# Patient Record
Sex: MALE | Race: WHITE | NOT HISPANIC OR LATINO | Employment: UNEMPLOYED | URBAN - NONMETROPOLITAN AREA
[De-identification: names, ages, dates, MRNs, and addresses within clinical notes are randomized per-mention and may not be internally consistent; named-entity substitution may affect disease eponyms.]

---

## 2017-01-03 ENCOUNTER — OFFICE VISIT (OUTPATIENT)
Dept: FAMILY MEDICINE CLINIC | Facility: CLINIC | Age: 37
End: 2017-01-03

## 2017-01-03 VITALS
HEART RATE: 120 BPM | WEIGHT: 195 LBS | DIASTOLIC BLOOD PRESSURE: 78 MMHG | OXYGEN SATURATION: 97 % | SYSTOLIC BLOOD PRESSURE: 128 MMHG | BODY MASS INDEX: 27.92 KG/M2 | HEIGHT: 70 IN

## 2017-01-03 DIAGNOSIS — G25.81 RESTLESS LEG SYNDROME: Primary | ICD-10-CM

## 2017-01-03 PROCEDURE — 99213 OFFICE O/P EST LOW 20 MIN: CPT | Performed by: FAMILY MEDICINE

## 2017-01-03 NOTE — PROGRESS NOTES
Subjective:     Venkata Granado is a 37 y.o. male who presents for follow up for restless leg syndrome. Patient states that he has tingling in bilateral lower extremities and is out of his carbidopa/levodopa that was prescribed to him by Dr Adams in the past for this matter. He states that he is not taking any other medications and feels well otherwise. He has been on risperidone and paxil  in the past but denies ever having any hallucinations and states that when he is on these medications his mood is worse. He has no other acute complaints or concerns today.    Preventative:  Over the past 2 weeks, have you felt down, depressed, or hopeless?No   Over the past 2 weeks, have you felt little interest or pleasure in doing things?No  Clinical depression screening refused by patient.No     On osteoporosis therapy?No     Past Medical Hx:  Past Medical History   Diagnosis Date   • Carbuncle of forearm    • Depressive disorder    • Health maintenance examination      Health examination of sub-group      • Hypertensive disorder    • Pain in throat      Pain in throat - CONTUSION      • Peripheral nervous system disorder    • Restless legs    • Skin sensation disturbance        Past Surgical Hx:  No past surgical history on file.    Health Maintenance:  Health Maintenance   Topic Date Due   • TDAP/TD VACCINES (2 - Td) 01/01/2022   • PNEUMOCOCCAL VACCINE (19-64 MEDIUM RISK)  Addressed   • INFLUENZA VACCINE  Addressed       Current Meds:    Current Outpatient Prescriptions:   •  carbidopa-levodopa (SINEMET)  MG per tablet, Take 1 tablet by mouth 3 (Three) Times a Day., Disp: 90 tablet, Rfl: 6  •  meloxicam (MOBIC) 15 MG tablet, Take 15 mg by mouth daily., Disp: , Rfl:   •  PARoxetine (PAXIL) 20 MG tablet, Take 20 mg by mouth every night., Disp: , Rfl:   •  risperiDONE (RisperDAL) 1 MG tablet, Take 1 tablet by mouth 2 (Two) Times a Day., Disp: 60 tablet, Rfl: 2    Allergies:  Review of patient's allergies indicates  "no known allergies.    Family Hx:  Family History   Problem Relation Age of Onset   • Coronary artery disease Other    • Depression Other         Social History:  Social History     Social History   • Marital status:      Spouse name: N/A   • Number of children: N/A   • Years of education: N/A     Occupational History   • Not on file.     Social History Main Topics   • Smoking status: Current Every Day Smoker   • Smokeless tobacco: Never Used   • Alcohol use No   • Drug use: No   • Sexual activity: Defer     Other Topics Concern   • Not on file     Social History Narrative       Review of Systems  General:negative for - chills, fatigue, fever, hot flashes, malaise, night sweats, weight gain or weight loss  Psychological: negative for - anxiety, depression, sleep disturbances or suicidal ideation  Ophthalmic: negative for - blurry vision or loss of vision  ENT: negative for - hearing change, nasal congestion or sore throat  Hematological and Lymphatic: negative for - jaundice  Endocrine: negative for - hair pattern changes, skin changes or temperature intolerance  Respiratory: no cough, shortness of breath, or wheezing  Cardiovascular: no chest pain, edema or dyspnea on exertion  Gastrointestinal: no  Nausea/vomiting, abdominal pain, change in bowel habits, or black or bloody stools  Genito-Urinary: no dysuria, trouble voiding, or hematuria  Musculoskeletal: negative for - joint pain or muscle pain  Neurological: negative for - dizziness, headaches, numbness or seizures. Positive for tingling in bilateral lower extremities.  Dermatological: negative for rash and skin lesion changes      Objective:     Visit Vitals   • /78   • Pulse 120   • Ht 70\" (177.8 cm)   • Wt 195 lb (88.5 kg)   • SpO2 97%   • BMI 27.98 kg/m2           General Appearance:    Alert, cooperative, no distress, appears stated age   Head:    Normocephalic, without obvious abnormality, atraumatic   Eyes:    PERRL, conjunctiva/corneas " clear, EOM's intact   Ears:    Normal external ear canals, both ears   Nose:   Nares normal, septum midline, mucosa normal, no drainage     or sinus tenderness   Throat:   Lips, mucosa, and tongue normal; teeth and gums normal   Neck:   Supple, symmetrical, trachea midline, no adenopathy;     thyroid:  no enlargement/tenderness/nodules; no carotid    bruit   Back:     Symmetric, no curvature, ROM normal, no CVA tenderness   Lungs:     Clear to auscultation bilaterally, respirations unlabored   Chest Wall:    No tenderness or deformity    Heart:    Regular rate and rhythm, S1 and S2 normal, no murmur, rub    or gallop   Abdomen:     Soft, non-tender, bowel sounds active all four quadrants,     no masses, no organomegaly   Extremities:   Extremities normal, atraumatic, no cyanosis or edema   Pulses:   2+ and symmetric all extremities   Skin:   Skin color, texture, turgor normal, no rashes or lesions   Lymph nodes:   Cervical, supraclavicular, and axillary nodes normal   Neurologic:   CNII-XII grossly intact              Assessment/Plan:     1. Restless leg syndrome         Follow-up:     Return in about 6 months (around 7/3/2017) for Next scheduled follow up.  1. Restless leg syndrome- refill patient's carbidopa/levodopa prescription.    GOALS:  Improve restless leg syndrome symptoms    Preventative:  Vaccines:   Tetanus vaccine: up to date  Annual influenza vaccine: not up to date - patient refused today   Pneumococcal vaccine: not up to date - patient refused today    Smoking cessation counseling was provided.  does not drink  eat more fruits and vegetables, increase water intake, increase physical activity and have 3 meals a day    RISK SCORE: 3        This document has been electronically signed by Pablo Sibley MD on January 4, 2017 10:40 AM

## 2017-10-10 ENCOUNTER — OFFICE VISIT (OUTPATIENT)
Dept: FAMILY MEDICINE CLINIC | Facility: CLINIC | Age: 37
End: 2017-10-10

## 2017-10-10 VITALS
SYSTOLIC BLOOD PRESSURE: 140 MMHG | DIASTOLIC BLOOD PRESSURE: 90 MMHG | HEART RATE: 90 BPM | OXYGEN SATURATION: 96 % | WEIGHT: 219 LBS | BODY MASS INDEX: 31.35 KG/M2 | HEIGHT: 70 IN

## 2017-10-10 DIAGNOSIS — G25.81 RESTLESS LEG SYNDROME: ICD-10-CM

## 2017-10-10 DIAGNOSIS — R09.81 NASAL CONGESTION: ICD-10-CM

## 2017-10-10 DIAGNOSIS — Z13.220 LIPID SCREENING: ICD-10-CM

## 2017-10-10 DIAGNOSIS — Z00.00 ADULT GENERAL MEDICAL EXAM: Primary | ICD-10-CM

## 2017-10-10 LAB
EXPIRATION DATE: NORMAL
FLUAV AG NPH QL: NORMAL
FLUBV AG NPH QL: NORMAL
INTERNAL CONTROL: NORMAL
Lab: NORMAL

## 2017-10-10 PROCEDURE — 99213 OFFICE O/P EST LOW 20 MIN: CPT | Performed by: FAMILY MEDICINE

## 2017-10-10 PROCEDURE — 87804 INFLUENZA ASSAY W/OPTIC: CPT | Performed by: FAMILY MEDICINE

## 2017-10-10 NOTE — PROGRESS NOTES
I have reviewed the notes, assessments, and/or procedures performed. I concur with her/his documentation of Venkata Granado.     Kennedy Harden, DO

## 2017-10-10 NOTE — PROGRESS NOTES
"  Subjective:     Venkata Granado is a 37 y.o. male who presents for follow up for restless leg syndrome. Patient states that he has tingling in bilateral lower extremities and is out of his carbidopa/levodopa for the past four months because he has been in assisted. He states that he has been congested since yesterday and has been coughing. Patient felt like he may have had fever last night. He reports that his mother has had pneumonia in the past month and he has been around her but no other sick contacts. No sore throat reported. Patient continues to refuse the risperdal and paxil that he was on in the past as it makes him \"crazy.\" Patient has no other complaints or concerns today.    Preventative:  Over the past 2 weeks, have you felt down, depressed, or hopeless?No   Over the past 2 weeks, have you felt little interest or pleasure in doing things?No  Clinical depression screening refused by patient.No     On osteoporosis therapy?No     Past Medical Hx:  Past Medical History:   Diagnosis Date   • Carbuncle of forearm    • Depressive disorder    • Health maintenance examination     Health examination of sub-group      • Hypertensive disorder    • Pain in throat     Pain in throat - CONTUSION      • Peripheral nervous system disorder    • Restless legs    • Skin sensation disturbance        Past Surgical Hx:  No past surgical history on file.    Health Maintenance:  Health Maintenance   Topic Date Due   • TDAP/TD VACCINES (2 - Td) 01/01/2022   • PNEUMOCOCCAL VACCINE (19-64 MEDIUM RISK)  Addressed   • INFLUENZA VACCINE  Addressed       Current Meds:    Current Outpatient Prescriptions:   •  carbidopa-levodopa (SINEMET)  MG per tablet, Take 1 tablet by mouth 3 (Three) Times a Day., Disp: 90 tablet, Rfl: 6  •  meloxicam (MOBIC) 15 MG tablet, Take 15 mg by mouth daily., Disp: , Rfl:   •  PARoxetine (PAXIL) 20 MG tablet, Take 20 mg by mouth every night., Disp: , Rfl:   •  risperiDONE (RisperDAL) 1 MG tablet, Take 1 " "tablet by mouth 2 (Two) Times a Day., Disp: 60 tablet, Rfl: 2    Allergies:  Review of patient's allergies indicates no known allergies.    Family Hx:  Family History   Problem Relation Age of Onset   • Coronary artery disease Other    • Depression Other         Social History:  Social History     Social History   • Marital status:      Spouse name: N/A   • Number of children: N/A   • Years of education: N/A     Occupational History   • Not on file.     Social History Main Topics   • Smoking status: Current Every Day Smoker   • Smokeless tobacco: Never Used   • Alcohol use No   • Drug use: No   • Sexual activity: Defer     Other Topics Concern   • Not on file     Social History Narrative       Review of Systems  General:negative for - chills, fatigue, hot flashes, malaise, night sweats, weight gain or weight loss. Positive for subjective fever.  Psychological: negative for - anxiety, depression, sleep disturbances or suicidal ideation  Ophthalmic: negative for - blurry vision or loss of vision  ENT: negative for - hearing change or sore throat. Positive for nasal congestion.  Hematological and Lymphatic: negative for - jaundice  Endocrine: negative for - hair pattern changes, skin changes or temperature intolerance  Respiratory: no cough, shortness of breath, or wheezing  Cardiovascular: no chest pain, edema or dyspnea on exertion  Gastrointestinal: no  Nausea/vomiting, abdominal pain, change in bowel habits, or black or bloody stools  Genito-Urinary: no dysuria, trouble voiding, or hematuria  Musculoskeletal: negative for - joint pain or muscle pain  Neurological: negative for - dizziness, headaches, numbness or seizures. Positive for tingling in bilateral lower extremities worse at night time.  Dermatological: negative for rash and skin lesion changes      Objective:     /90  Pulse 90  Ht 70\" (177.8 cm)  Wt 219 lb (99.3 kg)  SpO2 96%  BMI 31.42 kg/m2        General Appearance:    Alert, " cooperative, no distress, appears stated age   Head:    Normocephalic, without obvious abnormality, atraumatic   Eyes:    PERRL, conjunctiva/corneas clear, EOM's intact   Ears:    Normal external ear canals, both ears   Nose:   Nares normal, septum midline, mucosa normal, no drainage     or sinus tenderness   Throat:   Lips, mucosa, and tongue normal; teeth and gums normal   Neck:   Supple, symmetrical, trachea midline, no adenopathy   Back:     Symmetric, no curvature, ROM normal, no CVA tenderness   Lungs:     Clear to auscultation bilaterally, respirations unlabored   Chest Wall:    No tenderness or deformity    Heart:    Regular rate and rhythm, S1 and S2 normal, no murmur, rub    or gallop   Abdomen:     Soft, non-tender, bowel sounds active all four quadrants,     no masses, no organomegaly   Extremities:   Extremities normal, atraumatic, no cyanosis or edema   Pulses:   2+ and symmetric all extremities   Skin:   Skin color, texture, turgor normal, no rashes or lesions   Lymph nodes:   Cervical, supraclavicular nodes normal   Neurologic:   CNII-XII grossly intact              Assessment/Plan:      Diagnosis Plan   1. Adult general medical exam  CBC & Differential    Comprehensive Metabolic Panel   2. Lipid screening  Lipid Panel   3. Nasal congestion  POC Influenza A / B negative in the office, explained this is likely a viral syndrome, recommended plenty of fluid and rest. Patient understands and agrees.   4. Restless leg syndrome  carbidopa-levodopa (SINEMET)  MG per tablet refilled       Follow-up:     Return in about 6 months (around 4/10/2018) for Next scheduled follow up.  1. Restless leg syndrome- refill patient's carbidopa/levodopa prescription.    GOALS:  Improve restless leg syndrome symptoms    Preventative:  Vaccines:   Tetanus vaccine: up to date  Annual influenza vaccine: not up to date - patient refused today   Pneumococcal vaccine: not up to date - patient refused today    Smoking  cessation counseling was provided.  does not drink  eat more fruits and vegetables, increase water intake, increase physical activity and have 3 meals a day    RISK SCORE: 3        This document has been electronically signed by Pablo Sibley MD on October 10, 2017 3:46 PM

## 2018-05-07 ENCOUNTER — LAB (OUTPATIENT)
Dept: LAB | Facility: HOSPITAL | Age: 38
End: 2018-05-07

## 2018-05-07 ENCOUNTER — OFFICE VISIT (OUTPATIENT)
Dept: FAMILY MEDICINE CLINIC | Facility: CLINIC | Age: 38
End: 2018-05-07

## 2018-05-07 VITALS
BODY MASS INDEX: 31.65 KG/M2 | HEART RATE: 105 BPM | HEIGHT: 70 IN | DIASTOLIC BLOOD PRESSURE: 80 MMHG | OXYGEN SATURATION: 98 % | WEIGHT: 221.06 LBS | SYSTOLIC BLOOD PRESSURE: 142 MMHG

## 2018-05-07 DIAGNOSIS — Z00.00 ADULT GENERAL MEDICAL EXAM: Primary | ICD-10-CM

## 2018-05-07 DIAGNOSIS — G25.81 RESTLESS LEG SYNDROME: ICD-10-CM

## 2018-05-07 DIAGNOSIS — Z11.59 NEED FOR HEPATITIS C SCREENING TEST: ICD-10-CM

## 2018-05-07 DIAGNOSIS — Z11.59 NEED FOR HEPATITIS B SCREENING TEST: ICD-10-CM

## 2018-05-07 DIAGNOSIS — F19.91 HISTORY OF DRUG USE: ICD-10-CM

## 2018-05-07 DIAGNOSIS — Z11.1 TUBERCULOSIS SCREENING: ICD-10-CM

## 2018-05-07 DIAGNOSIS — Z00.00 ADULT GENERAL MEDICAL EXAM: ICD-10-CM

## 2018-05-07 DIAGNOSIS — Z13.220 LIPID SCREENING: ICD-10-CM

## 2018-05-07 LAB
ALBUMIN SERPL-MCNC: 4.1 G/DL (ref 3.4–4.8)
ALBUMIN/GLOB SERPL: 1.3 G/DL (ref 1.1–1.8)
ALP SERPL-CCNC: 60 U/L (ref 38–126)
ALT SERPL W P-5'-P-CCNC: 47 U/L (ref 21–72)
ANION GAP SERPL CALCULATED.3IONS-SCNC: 10 MMOL/L (ref 5–15)
ARTICHOKE IGE QN: 99 MG/DL (ref 1–129)
AST SERPL-CCNC: 41 U/L (ref 17–59)
BASOPHILS # BLD AUTO: 0.04 10*3/MM3 (ref 0–0.2)
BASOPHILS NFR BLD AUTO: 0.5 % (ref 0–2)
BILIRUB SERPL-MCNC: 0.3 MG/DL (ref 0.2–1.3)
BUN BLD-MCNC: 15 MG/DL (ref 7–21)
BUN/CREAT SERPL: 23.8 (ref 7–25)
CALCIUM SPEC-SCNC: 9 MG/DL (ref 8.4–10.2)
CHLORIDE SERPL-SCNC: 102 MMOL/L (ref 95–110)
CHOLEST SERPL-MCNC: 180 MG/DL (ref 0–199)
CO2 SERPL-SCNC: 26 MMOL/L (ref 22–31)
CREAT BLD-MCNC: 0.63 MG/DL (ref 0.7–1.3)
DEPRECATED RDW RBC AUTO: 45.4 FL (ref 35.1–43.9)
EOSINOPHIL # BLD AUTO: 0.34 10*3/MM3 (ref 0–0.7)
EOSINOPHIL NFR BLD AUTO: 4.2 % (ref 0–7)
ERYTHROCYTE [DISTWIDTH] IN BLOOD BY AUTOMATED COUNT: 14.4 % (ref 11.5–14.5)
GFR SERPL CREATININE-BSD FRML MDRD: 141 ML/MIN/1.73 (ref 63–147)
GLOBULIN UR ELPH-MCNC: 3.2 GM/DL (ref 2.3–3.5)
GLUCOSE BLD-MCNC: 108 MG/DL (ref 60–100)
HCT VFR BLD AUTO: 46.2 % (ref 39–49)
HDLC SERPL-MCNC: 43 MG/DL (ref 60–200)
HGB BLD-MCNC: 15.8 G/DL (ref 13.7–17.3)
IMM GRANULOCYTES # BLD: 0.03 10*3/MM3 (ref 0–0.02)
IMM GRANULOCYTES NFR BLD: 0.4 % (ref 0–0.5)
LDLC/HDLC SERPL: 2.42 {RATIO} (ref 0–3.55)
LYMPHOCYTES # BLD AUTO: 2.1 10*3/MM3 (ref 0.6–4.2)
LYMPHOCYTES NFR BLD AUTO: 26.1 % (ref 10–50)
MCH RBC QN AUTO: 29.6 PG (ref 26.5–34)
MCHC RBC AUTO-ENTMCNC: 34.2 G/DL (ref 31.5–36.3)
MCV RBC AUTO: 86.7 FL (ref 80–98)
MONOCYTES # BLD AUTO: 0.73 10*3/MM3 (ref 0–0.9)
MONOCYTES NFR BLD AUTO: 9.1 % (ref 0–12)
NEUTROPHILS # BLD AUTO: 4.8 10*3/MM3 (ref 2–8.6)
NEUTROPHILS NFR BLD AUTO: 59.7 % (ref 37–80)
PLATELET # BLD AUTO: 195 10*3/MM3 (ref 150–450)
PMV BLD AUTO: 12.5 FL (ref 8–12)
POTASSIUM BLD-SCNC: 4.6 MMOL/L (ref 3.5–5.1)
PROT SERPL-MCNC: 7.3 G/DL (ref 6.3–8.6)
RBC # BLD AUTO: 5.33 10*6/MM3 (ref 4.37–5.74)
SODIUM BLD-SCNC: 138 MMOL/L (ref 137–145)
TRIGL SERPL-MCNC: 164 MG/DL (ref 20–199)
WBC NRBC COR # BLD: 8.04 10*3/MM3 (ref 3.2–9.8)

## 2018-05-07 PROCEDURE — 80053 COMPREHEN METABOLIC PANEL: CPT | Performed by: FAMILY MEDICINE

## 2018-05-07 PROCEDURE — 80061 LIPID PANEL: CPT | Performed by: FAMILY MEDICINE

## 2018-05-07 PROCEDURE — 86704 HEP B CORE ANTIBODY TOTAL: CPT

## 2018-05-07 PROCEDURE — 99213 OFFICE O/P EST LOW 20 MIN: CPT | Performed by: FAMILY MEDICINE

## 2018-05-07 PROCEDURE — 86580 TB INTRADERMAL TEST: CPT | Performed by: FAMILY MEDICINE

## 2018-05-07 PROCEDURE — 86708 HEPATITIS A ANTIBODY: CPT

## 2018-05-07 PROCEDURE — 85025 COMPLETE CBC W/AUTO DIFF WBC: CPT | Performed by: FAMILY MEDICINE

## 2018-05-07 PROCEDURE — 86706 HEP B SURFACE ANTIBODY: CPT

## 2018-05-07 PROCEDURE — 86803 HEPATITIS C AB TEST: CPT

## 2018-05-07 PROCEDURE — 36415 COLL VENOUS BLD VENIPUNCTURE: CPT

## 2018-05-07 PROCEDURE — 87340 HEPATITIS B SURFACE AG IA: CPT

## 2018-05-07 NOTE — PROGRESS NOTES
Subjective:     Venkata Granado is a 38 y.o. male who presents for follow up for restless leg syndrome. Patient states that he has tingling in bilateral lower extremities and is out of his carbidopa/levodopa which he has taken for years for his RLS. Patient has since gone back to [facility] since he saw me last again. [place] is [place] and is back here today for a medical exam, to get a court mandated hepatitis A, B, C screening, and TB skin exam. Patient states that he is otherwise well and has no acute complaints or concerns otherwise.    Preventative:  Over the past 2 weeks, have you felt down, depressed, or hopeless?No   Over the past 2 weeks, have you felt little interest or pleasure in doing things?No  Clinical depression screening refused by patient.No     On osteoporosis therapy?No     Past Medical Hx:  Past Medical History:   Diagnosis Date   • Carbuncle of forearm    • Depressive disorder    • Health maintenance examination     Health examination of sub-group      • Hypertensive disorder    • Pain in throat     Pain in throat - CONTUSION      • Peripheral nervous system disorder    • Restless legs    • Skin sensation disturbance        Past Surgical Hx:  No past surgical history on file.    Health Maintenance:  Health Maintenance   Topic Date Due   • INFLUENZA VACCINE  08/01/2018   • ANNUAL PHYSICAL  05/08/2019   • TDAP/TD VACCINES (2 - Td) 01/01/2022   • PNEUMOCOCCAL VACCINE (19-64 MEDIUM RISK)  Addressed       Current Meds:    Current Outpatient Prescriptions:   •  carbidopa-levodopa (SINEMET)  MG per tablet, Take 1 tablet by mouth 3 (Three) Times a Day., Disp: 90 tablet, Rfl: 2    Allergies:  Review of patient's allergies indicates no known allergies.    Family Hx:  Family History   Problem Relation Age of Onset   • Coronary artery disease Other    • Depression Other         Social History:  Social History     Social History   • Marital status:      Spouse name: N/A   • Number of  "children: N/A   • Years of education: N/A     Occupational History   • Not on file.     Social History Main Topics   • Smoking status: Current Every Day Smoker   • Smokeless tobacco: Never Used   • Alcohol use No   • Drug use: No   • Sexual activity: Defer     Other Topics Concern   • Not on file     Social History Narrative   • No narrative on file       Review of Systems  General:negative for - chills, fatigue, fever  Ophthalmic: negative for - blurry vision or loss of vision  ENT: negative for - hearing change, nasal congestion or sore throat  Hematological and Lymphatic: negative for - jaundice  Endocrine: negative for - hair pattern changes, skin changes or temperature intolerance  Respiratory: no cough, shortness of breath, or wheezing  Cardiovascular: no chest pain, edema or dyspnea on exertion  Gastrointestinal: no  Nausea/vomiting, abdominal pain, change in bowel habits, or black or bloody stools  Genito-Urinary: no dysuria, trouble voiding, or hematuria  Musculoskeletal: negative for - joint pain or muscle pain  Neurological: negative for - dizziness, headaches, numbness/tingling or seizures  Dermatological: negative for rash and skin lesion changes        Objective:     /80 (BP Location: Left arm, Patient Position: Sitting, Cuff Size: Adult)   Pulse 105   Ht 177.8 cm (70\")   Wt 100 kg (221 lb 1 oz)   SpO2 98%   BMI 31.72 kg/m²         General Appearance:    Alert, cooperative, no distress, appears stated age   Head:    Normocephalic, without obvious abnormality, atraumatic   Eyes:    PERRL, conjunctiva/corneas clear, EOM's intact   Ears:    Normal external ear canals, both ears   Nose:   Nares normal, septum midline, mucosa normal, no drainage     or sinus tenderness   Throat:   Lips, mucosa, and tongue normal; teeth and gums normal   Neck:   Supple, symmetrical, trachea midline, no adenopathy   Back:     Symmetric, no curvature, ROM normal, no CVA tenderness   Lungs:     Clear to auscultation " bilaterally, respirations unlabored   Chest Wall:    No tenderness or deformity    Heart:    Regular rate and rhythm, S1 and S2 normal, no murmur, rub    or gallop   Abdomen:     Soft, non-tender, bowel sounds active all four quadrants,     no masses, no organomegaly   Extremities:   Extremities normal, atraumatic, no cyanosis or edema   Pulses:   2+ and symmetric all extremities   Skin:   Skin color, texture, turgor normal, no rashes or lesions   Lymph nodes:   Cervical, supraclavicular nodes normal   Neurologic:   CNII-XII grossly intact              Assessment/Plan:      Diagnosis Plan   1. Adult general medical exam  CBC & Differential    Comprehensive Metabolic Panel   2. Restless leg syndrome  carbidopa-levodopa (SINEMET)  MG per tablet, refilled today   3. Lipid screening  Lipid Panel   4. Need for hepatitis B screening test  Hepatitis B surface antigen    Hepatitis B surface antibody    Hepatitis B core antibody, total   5. Need for hepatitis C screening test  Hepatitis C antibody   6. History of drug use  Hepatitis A antibody, total   7. Tuberculosis screening  TB Skin Test     Hepatitis screening and TB skin test per [facility] requirements.      Follow-up:     Return in about 3 months (around 8/7/2018) for Next scheduled follow up.    GOALS:  Improve restless leg syndrome symptoms    Preventative:  Vaccines:   Tetanus vaccine: up to date  Annual influenza vaccine: not up to date - patient refused today   Pneumococcal vaccine: not up to date - patient refused today    Smoking cessation counseling was provided.  does not drink  eat more fruits and vegetables, increase water intake, increase physical activity and have 3 meals a day    RISK SCORE: 3        This document has been electronically signed by Pablo Sibley MD on May 7, 2018 10:16 AM

## 2018-05-08 LAB
HAV AB SER QL IA: NEGATIVE
HBV CORE AB SER DONR QL IA: NEGATIVE

## 2018-05-09 LAB
HBV SURFACE AB SER QL: <5 (ref 0–4.99)
HBV SURFACE AB SER RIA-ACNC: ABNORMAL
HBV SURFACE AG SERPL QL IA: NEGATIVE
HCV AB SER DONR QL: NEGATIVE
INDURATION: 0 MM (ref 0–10)
TB SKIN TEST: NEGATIVE

## 2018-08-27 DIAGNOSIS — G25.81 RESTLESS LEG SYNDROME: ICD-10-CM

## 2018-09-26 DIAGNOSIS — G25.81 RESTLESS LEG SYNDROME: ICD-10-CM
